# Patient Record
Sex: MALE | Race: BLACK OR AFRICAN AMERICAN | NOT HISPANIC OR LATINO | URBAN - METROPOLITAN AREA
[De-identification: names, ages, dates, MRNs, and addresses within clinical notes are randomized per-mention and may not be internally consistent; named-entity substitution may affect disease eponyms.]

---

## 2022-09-14 ENCOUNTER — OFFICE VISIT (OUTPATIENT)
Dept: URGENT CARE | Facility: CLINIC | Age: 22
End: 2022-09-14
Payer: MEDICAID

## 2022-09-14 VITALS
DIASTOLIC BLOOD PRESSURE: 84 MMHG | HEART RATE: 69 BPM | HEIGHT: 71 IN | TEMPERATURE: 98 F | WEIGHT: 297.88 LBS | RESPIRATION RATE: 16 BRPM | OXYGEN SATURATION: 98 % | BODY MASS INDEX: 41.7 KG/M2 | SYSTOLIC BLOOD PRESSURE: 129 MMHG

## 2022-09-14 DIAGNOSIS — K21.9 GASTROESOPHAGEAL REFLUX DISEASE, UNSPECIFIED WHETHER ESOPHAGITIS PRESENT: Primary | ICD-10-CM

## 2022-09-14 DIAGNOSIS — Z76.89 ENCOUNTER TO ESTABLISH CARE: ICD-10-CM

## 2022-09-14 PROCEDURE — 99203 PR OFFICE/OUTPT VISIT, NEW, LEVL III, 30-44 MIN: ICD-10-PCS | Mod: S$PBB,,, | Performed by: NURSE PRACTITIONER

## 2022-09-14 PROCEDURE — 99204 OFFICE O/P NEW MOD 45 MIN: CPT | Mod: PBBFAC | Performed by: NURSE PRACTITIONER

## 2022-09-14 PROCEDURE — 99203 OFFICE O/P NEW LOW 30 MIN: CPT | Mod: S$PBB,,, | Performed by: NURSE PRACTITIONER

## 2022-09-14 RX ORDER — OMEPRAZOLE 20 MG/1
20 CAPSULE, DELAYED RELEASE ORAL DAILY
Qty: 30 CAPSULE | Refills: 0 | Status: SHIPPED | OUTPATIENT
Start: 2022-09-14 | End: 2022-10-14

## 2022-09-14 NOTE — LETTER
September 14, 2022      [facility]  [address] [facility] [address]  Phone: [phone]       Patient: Ponce Cole   YOB: 2000  Date of Visit: 09/14/2022    To Whom It May Concern:    Irish Cole  was at [facility] on 09/14/2022. The patient may return to work/school on 9/15/22. If you have any questions or concerns, or if I can be of further assistance, please do not hesitate to contact me.    Sincerely,    BABATUNDE Hernandez NP

## 2022-09-14 NOTE — PATIENT INSTRUCTIONS
- Instructed patient to take omeprazole 30 minutes before eating  - Advised about diet and lifestyle modifications related to reflux   -Referred to family practice to establish primary care provider.   -Please follow instructions on patient education material.  Return to urgent care in 2 to 3 days if symptoms are not improving, immediately if you develop any new or worsening symptoms.

## 2022-09-14 NOTE — PROGRESS NOTES
"Subjective:       Patient ID: Ponce Cole is a 22 y.o. male.    Vitals:  height is 5' 11.26" (1.81 m) and weight is 135.1 kg (297 lb 14.4 oz). His temperature is 98.2 °F (36.8 °C). His blood pressure is 129/84 and his pulse is 69. His respiration is 16 and oxygen saturation is 98%.     Chief Complaint: States acid reflux (Epigastric burning with nausea. ~ 1 year)    He reports having epigastric pain with a burning sensation intermittently for several years which occurs mostly in the mornings. He will experience nausea at times with reflux symptoms. He says that he was on Pepcid years ago but is no longer taking it. Reflux symptoms resolved with Tums. He denies chest pain, SOB, irregular heartbeat, dizziness, headaches.     No PCP.         Constitution: Negative for activity change, appetite change, fatigue and fever.   HENT:  Negative for trouble swallowing.    Neck: neck negative.   Cardiovascular:  Negative for chest pain, palpitations and sob on exertion.   Respiratory: Negative.     Gastrointestinal:  Positive for nausea (associated with reflux symptoms) and heartburn. Negative for abdominal pain, vomiting, constipation and diarrhea.   Musculoskeletal: Negative.    Neurological:  Negative for dizziness and headaches.     Objective:      Physical Exam      Assessment:       1. Gastroesophageal reflux disease, unspecified whether esophagitis present          Plan:         Gastroesophageal reflux disease, unspecified whether esophagitis present  - Instructed patient to take omeprazole 30 minutes before eating  - Advised about diet and lifestyle modifications related to reflux   -Referred to family practice to establish primary care provider.   -Please follow instructions on patient education material.  Return to urgent care in 2 to 3 days if symptoms are not improving, immediately if you develop any new or worsening symptoms.                   "

## 2023-06-20 ENCOUNTER — PATIENT MESSAGE (OUTPATIENT)
Dept: RESEARCH | Facility: HOSPITAL | Age: 23
End: 2023-06-20
Payer: MEDICAID

## 2024-06-09 ENCOUNTER — OFFICE VISIT (OUTPATIENT)
Dept: URGENT CARE | Facility: CLINIC | Age: 24
End: 2024-06-09

## 2024-06-09 VITALS
BODY MASS INDEX: 39.31 KG/M2 | DIASTOLIC BLOOD PRESSURE: 91 MMHG | HEART RATE: 62 BPM | RESPIRATION RATE: 19 BRPM | OXYGEN SATURATION: 98 % | WEIGHT: 280.81 LBS | SYSTOLIC BLOOD PRESSURE: 132 MMHG | TEMPERATURE: 99 F | HEIGHT: 71 IN

## 2024-06-09 DIAGNOSIS — M79.631 RIGHT FOREARM PAIN: ICD-10-CM

## 2024-06-09 DIAGNOSIS — R03.0 ELEVATED BLOOD PRESSURE READING WITHOUT DIAGNOSIS OF HYPERTENSION: ICD-10-CM

## 2024-06-09 DIAGNOSIS — R51.9 SCALP PAIN: Primary | ICD-10-CM

## 2024-06-09 PROCEDURE — 99214 OFFICE O/P EST MOD 30 MIN: CPT | Mod: PBBFAC | Performed by: NURSE PRACTITIONER

## 2024-06-09 PROCEDURE — 99203 OFFICE O/P NEW LOW 30 MIN: CPT | Mod: S$PBB,,, | Performed by: NURSE PRACTITIONER

## 2024-06-09 RX ORDER — DICLOFENAC SODIUM 75 MG/1
75 TABLET, DELAYED RELEASE ORAL 2 TIMES DAILY
Qty: 28 TABLET | Refills: 0 | Status: SHIPPED | OUTPATIENT
Start: 2024-06-09 | End: 2024-06-23

## 2024-06-09 NOTE — LETTER
June 9, 2024      [facility]  [facility] [address]  Phone: [phone]       Patient: Ponce Cole   YOB: 2000  Date of Visit: 06/09/2024    To Whom It May Concern:    Irish Cole  was at [facility] on 06/09/2024. The patient may return to work/school on 06/10/2024 with no restrictions. If you have any questions or concerns, or if I can be of further assistance, please do not hesitate to contact me.    Sincerely,    CRUZITO Reardon

## 2024-06-09 NOTE — PROGRESS NOTES
"Subjective:      Patient ID: Ponce Cole is a 24 y.o. male.    Vitals:  height is 5' 11.26" (1.81 m) and weight is 127.4 kg (280 lb 12.8 oz). His oral temperature is 98.5 °F (36.9 °C). His blood pressure is 132/91 (abnormal) and his pulse is 62. His respiration is 19 and oxygen saturation is 98%.     Chief Complaint: Arm Pain (Left sided, 2 days. Pt was lifting some things.) and Headache (To back of head, for 2 weeks)    Arm Pain     Headache      As stated in CC. Pt is c/o scalp pain to occipital head x 2 weeks. Pt is wearing a hat today. Pt reports pain in area where hat is resting on scalp. Pt reports pain is dull and sometimes constant. Pt denies realizing if pain is present without hat, but he states " I wear the hat all day until bed time usually".  Pt described left and right forearm pain as it is present when "picking up items like car batteries,some time I can just drop them, Pain feels sore sometimes too." Pain is intermittent and pain began in left arm but is now in both. Pt denies wrist pain, extremity weakness, visual disturbances, head trauma, injury, fall, shortness of breath, chest pain, cough    Neurological:  Positive for headaches.      Objective:     Physical Exam   Constitutional: He appears well-developed.  Non-toxic appearance. He does not appear ill. No distress.   HENT:   Head: Atraumatic.   Nose: No purulent discharge. Right sinus exhibits no maxillary sinus tenderness and no frontal sinus tenderness. Left sinus exhibits no maxillary sinus tenderness and no frontal sinus tenderness.   Mouth/Throat: Uvula is midline.   Eyes: Right eye exhibits no discharge. Left eye exhibits no discharge. Extraocular movement intact   Neck: Neck supple. No neck rigidity present.   Cardiovascular: Regular rhythm.   Pulmonary/Chest: Effort normal and breath sounds normal. No respiratory distress. He has no wheezes. He has no rales.   Abdominal: Bowel sounds are normal.   Musculoskeletal: Normal range of " motion.         General: No swelling, tenderness, deformity or signs of injury. Normal range of motion.        Arms:       Right hand: Normal. He exhibits normal capillary refill. Decreased sensation is not present in the ulnar distribution, is not present in the medial distribution and is not present in the radial distribution. Motor /Testing: The patient has normal right wrist strength.      Left hand: Normal. He exhibits normal capillary refill. Decreased sensation is not present in the ulnar distribution, is not present in the medial redistribution and is not present in the radial distribution. Motor /Testing: The patient has normal left wrist strength.      Right lower leg: No edema.      Left lower leg: No edema.      Comments: No TTP at site that are areas of complaint. There is brisk cap refill to fingers, strong  to bilateral hand and no wrist TTP or swelling. There is a lot of callus to palm of hand   Lymphadenopathy:     He has no cervical adenopathy.   Neurological: He is alert.   Skin: Skin is warm, dry and not diaphoretic.   Psychiatric: His behavior is normal. Mood, judgment and thought content normal.   Nursing note and vitals reviewed.      Assessment:     1. Scalp pain    2. Right forearm pain    3. Elevated blood pressure reading without diagnosis of hypertension        Plan:   ER precautions given  Referral for PCP sent today  Discussed elevated blood pressure and the risk, instructed on monitoring his headaches and blood pressure.   Discussed forearm pain and carpel tunnel syndrome and splinting for comfort  Encouraged RICE.  Treated with NSAID for pain.       Scalp pain  -     diclofenac (VOLTAREN) 75 MG EC tablet; Take 1 tablet (75 mg total) by mouth 2 (two) times daily. With food, if needed for pain. Do not combine with ibuprofen, naproxen or other NSAIDs. for 14 days  Dispense: 28 tablet; Refill: 0  -     Ambulatory referral/consult to Internal Medicine    Right forearm pain  -      diclofenac (VOLTAREN) 75 MG EC tablet; Take 1 tablet (75 mg total) by mouth 2 (two) times daily. With food, if needed for pain. Do not combine with ibuprofen, naproxen or other NSAIDs. for 14 days  Dispense: 28 tablet; Refill: 0  -     Ambulatory referral/consult to Internal Medicine    Elevated blood pressure reading without diagnosis of hypertension  -     Ambulatory referral/consult to Internal Medicine          Medical Decision Making:   Differential Diagnosis:   Carpel tunnel syndrome, De Quervain tendinopathy  Uncontrolled blood pressure, Tension headache, Cluster headache

## 2024-06-09 NOTE — PATIENT INSTRUCTIONS
Please follow instructions on patient education material.  Return to urgent care in 2 to 3 days if symptoms are not improving, immediately if you develop any new or worsening symptoms.   Although It appears that your head pain is related to the consistent wear of your hat and the hardware is pressing into your skin, As discussed it is important to monitor your headaches overall and also as it relates to uncontrolled blood pressure.   Referral for PCP sent today  - drink plenty of water, take breaks at work to elevate your feet, wear compression socks to your work shifts  -Take your blood pressure about 4-5 times over the next 1-2 weeks  If readings remain >160/>100 please return to this clinic.. Please bring in your blood pressure and heart rate log sheet if you have to come back, or to your next appointment with your Primary Care Doctor or Provider.    If you have change in vision, confusion, difficulty speaking or understanding, weakness of one side of your face, weakness or heaviness in 1 arm or leg, or numbness/tingling in one side - please go immediately to the ER.

## 2024-06-12 ENCOUNTER — OFFICE VISIT (OUTPATIENT)
Dept: URGENT CARE | Facility: CLINIC | Age: 24
End: 2024-06-12

## 2024-06-12 VITALS
DIASTOLIC BLOOD PRESSURE: 85 MMHG | SYSTOLIC BLOOD PRESSURE: 149 MMHG | HEART RATE: 64 BPM | TEMPERATURE: 98 F | BODY MASS INDEX: 38.06 KG/M2 | WEIGHT: 281 LBS | OXYGEN SATURATION: 99 % | RESPIRATION RATE: 16 BRPM | HEIGHT: 72 IN

## 2024-06-12 DIAGNOSIS — I10 PRIMARY HYPERTENSION: ICD-10-CM

## 2024-06-12 DIAGNOSIS — R51.9 RECURRENT OCCIPITAL HEADACHE: ICD-10-CM

## 2024-06-12 DIAGNOSIS — R03.0 ELEVATED BLOOD PRESSURE READING: ICD-10-CM

## 2024-06-12 PROCEDURE — 99214 OFFICE O/P EST MOD 30 MIN: CPT | Mod: PBBFAC

## 2024-06-12 PROCEDURE — 99213 OFFICE O/P EST LOW 20 MIN: CPT | Mod: S$PBB,,,

## 2024-06-12 RX ORDER — AMLODIPINE BESYLATE 5 MG/1
5 TABLET ORAL DAILY
Qty: 30 TABLET | Refills: 0 | Status: SHIPPED | OUTPATIENT
Start: 2024-06-12 | End: 2024-07-12

## 2024-06-12 RX ORDER — BUTALBITAL, ACETAMINOPHEN AND CAFFEINE 50; 325; 40 MG/1; MG/1; MG/1
1 TABLET ORAL EVERY 6 HOURS PRN
Qty: 20 TABLET | Refills: 0 | Status: SHIPPED | OUTPATIENT
Start: 2024-06-12

## 2024-06-12 NOTE — PROGRESS NOTES
Subjective:       Patient ID: Ponce Cole is a 24 y.o. male.    Vitals:  height is 6' (1.829 m) and weight is 127.5 kg (281 lb). His oral temperature is 97.8 °F (36.6 °C). His blood pressure is 149/85 (abnormal) and his pulse is 64. His respiration is 16 and oxygen saturation is 99%.     Chief Complaint: Head Pressure (Patient reports having a pressure to the back of is head. Patient was seen on 6/9 and was told to return if not better.)    25 y/o AAM presents to clinic alone, reports symptoms X 2 weeks with no improvement with Diclofenac prescribed. Describes pain as constant pressure in back of head. Denies any injuries or trauma. Denies HTN hx.         Constitution: Negative for fever.   HENT:  Negative for facial trauma, sinus pain and sinus pressure.    Eyes:  Negative for blurred vision.   Neurological:  Positive for headaches. Negative for dizziness, speech difficulty and history of migraines.       Objective:      Physical Exam   Constitutional: He is oriented to person, place, and time. He is cooperative. He is easily aroused. He does not appear ill. overweightawake  HENT:   Head: Normocephalic and atraumatic.   Ears:   Right Ear: A middle ear effusion is present.   Left Ear: Tympanic membrane normal.   Nose: Nose normal.   Mouth/Throat: Uvula is midline and oropharynx is clear and moist.   Eyes: Conjunctivae and lids are normal. Pupils are equal, round, and reactive to light. Lids are everted and swept, no foreign bodies found. Extraocular movement intact vision grossly intact gaze aligned appropriately   Neck: Trachea normal and phonation normal. Neck supple. No JVD present.   Cardiovascular: S1 normal, S2 normal and normal heart sounds. Bradycardia present.   Pulses:       Radial pulses are 2+ on the right side and 2+ on the left side.      Comments: Apical 56 BPM    Pulmonary/Chest: Effort normal and breath sounds normal.   Lymphadenopathy:     He has no cervical adenopathy.   Neurological: He is  alert, oriented to person, place, and time and easily aroused. He has normal sensation and intact cranial nerves (2-12). Gait and coordination normal. GCS eye subscore is 4. GCS verbal subscore is 5. GCS motor subscore is 6.   Skin: Skin is warm, dry and intact. Capillary refill takes 2 to 3 seconds.   Psychiatric: His speech is normal and behavior is normal. Memory normal. Cognition normal  Nursing note and vitals reviewed.        Assessment:       1. Recurrent occipital headache    2. Primary hypertension    3. Elevated blood pressure reading          Plan:     Differentials discussed with Ponce. Will Rx Norvasc r/t symptomatic and two separate occasions of elevated BP.     Encouraged patient to adhere to lifestyle modifications, keep a log BPs, when to report to ER. Keep your scheduled appt with PCP next month.     Recurrent occipital headache  -     butalbital-acetaminophen-caffeine -40 mg (FIORICET, ESGIC) -40 mg per tablet; Take 1 tablet by mouth every 6 (six) hours as needed for Pain or Headaches.  Dispense: 20 tablet; Refill: 0    Primary hypertension  -     amLODIPine (NORVASC) 5 MG tablet; Take 1 tablet (5 mg total) by mouth once daily.  Dispense: 30 tablet; Refill: 0    Elevated blood pressure reading           No results found for this or any previous visit.     Medical Decision Making:   Differential Diagnosis:   Tension Headache, Cluster HA, HTN,

## 2024-06-12 NOTE — LETTER
June 12, 2024      [facility]  [facility] [facility] [address]  Phone: [phone]       Patient: Ponce Cole   YOB: 2000  Date of Visit: 06/12/2024    To Whom It May Concern:    Irish Cole  was at [facility] on 06/12/2024. The patient may return to work/school on 06/13/2024 with no restrictions. If you have any questions or concerns, or if I can be of further assistance, please do not hesitate to contact me.    Sincerely,    CRUZITO Rousseau

## 2024-08-13 ENCOUNTER — OFFICE VISIT (OUTPATIENT)
Dept: URGENT CARE | Facility: CLINIC | Age: 24
End: 2024-08-13

## 2024-08-13 VITALS
BODY MASS INDEX: 38.19 KG/M2 | SYSTOLIC BLOOD PRESSURE: 143 MMHG | WEIGHT: 282 LBS | HEART RATE: 95 BPM | RESPIRATION RATE: 20 BRPM | TEMPERATURE: 102 F | HEIGHT: 72 IN | DIASTOLIC BLOOD PRESSURE: 87 MMHG | OXYGEN SATURATION: 100 %

## 2024-08-13 DIAGNOSIS — R50.9 FEVER, UNSPECIFIED FEVER CAUSE: ICD-10-CM

## 2024-08-13 DIAGNOSIS — J00 ACUTE NASOPHARYNGITIS (COMMON COLD): Primary | ICD-10-CM

## 2024-08-13 LAB
CTP QC/QA: YES
MOLECULAR STREP A: NEGATIVE
POC MOLECULAR INFLUENZA A AGN: NEGATIVE
POC MOLECULAR INFLUENZA B AGN: NEGATIVE
SARS-COV-2 RDRP RESP QL NAA+PROBE: NEGATIVE

## 2024-08-13 PROCEDURE — 87635 SARS-COV-2 COVID-19 AMP PRB: CPT | Mod: PBBFAC

## 2024-08-13 PROCEDURE — 99214 OFFICE O/P EST MOD 30 MIN: CPT | Mod: S$PBB,,,

## 2024-08-13 PROCEDURE — 87651 STREP A DNA AMP PROBE: CPT | Mod: PBBFAC

## 2024-08-13 PROCEDURE — 99215 OFFICE O/P EST HI 40 MIN: CPT | Mod: PBBFAC

## 2024-08-13 PROCEDURE — 25000003 PHARM REV CODE 250

## 2024-08-13 PROCEDURE — 87502 INFLUENZA DNA AMP PROBE: CPT | Mod: PBBFAC

## 2024-08-13 RX ORDER — IBUPROFEN 600 MG/1
600 TABLET ORAL
Status: COMPLETED | OUTPATIENT
Start: 2024-08-13 | End: 2024-08-13

## 2024-08-13 RX ORDER — ACETAMINOPHEN AND CODEINE PHOSPHATE 300; 30 MG/1; MG/1
1 TABLET ORAL EVERY 6 HOURS PRN
Qty: 12 TABLET | Refills: 0 | Status: SHIPPED | OUTPATIENT
Start: 2024-08-13 | End: 2024-08-16

## 2024-08-13 RX ADMIN — IBUPROFEN 600 MG: 600 TABLET ORAL at 02:08

## 2024-08-13 NOTE — LETTER
August 13, 2024      [facility]  [facility] [facility] [address]  Phone: [phone]       Patient: Ponce Cole   YOB: 2000  Date of Visit: 08/13/2024    To Whom It May Concern:    Irish Cole  was at [facility] on 08/13/2024. The patient may return to work/school on 08/16/2024 with no restrictions. If you have any questions or concerns, or if I can be of further assistance, please do not hesitate to contact me.    Sincerely,    CRUZITO Rousseau

## 2024-08-13 NOTE — PROGRESS NOTES
Subjective:       Patient ID: Ponce Cole is a 24 y.o. male.    Vitals:  height is 6' (1.829 m) and weight is 127.9 kg (282 lb). His oral temperature is 102.4 °F (39.1 °C) (abnormal). His blood pressure is 143/87 (abnormal) and his pulse is 95. His respiration is 20 and oxygen saturation is 100%.     Chief Complaint: Sore Throat (sore throat, body aches, joint pain,fever, chills and head ache - Entered by patient) and URI (Cough, nasal congestion, body aches, headache, sore throat, fatigue and chills. Exposed to Covid)    Sore Throat   Associated symptoms include congestion, coughing, diarrhea, headaches and vomiting. Pertinent negatives include no ear pain.   URI   Associated symptoms include congestion, coughing, diarrhea, headaches, nausea, a sore throat and vomiting. Pertinent negatives include no ear pain.       Constitution: Positive for fever.   HENT:  Positive for congestion and sore throat. Negative for ear pain.    Respiratory:  Positive for cough. Negative for sputum production, COPD and asthma.    Gastrointestinal:  Positive for nausea, vomiting and diarrhea.   Musculoskeletal:  Positive for muscle ache.   Allergic/Immunologic: Negative for asthma.   Neurological:  Positive for headaches.       Objective:      Physical Exam   Constitutional: He is oriented to person, place, and time. He is cooperative. He is easily aroused.  Non-toxic appearance. He appears ill. obesityawake  HENT:   Head: Normocephalic and atraumatic.   Ears:   Right Ear: Tympanic membrane and ear canal normal.   Left Ear: Tympanic membrane and ear canal normal.   Nose: Mucosal edema present.   Mouth/Throat: Uvula is midline and mucous membranes are normal. Dental caries present. Posterior oropharyngeal erythema present. Tonsils are 2+ on the right. Tonsils are 2+ on the left. No tonsillar exudate.   Eyes: Lids are normal. Right conjunctiva is injected. Left conjunctiva is injected.   Neck: Neck supple.   Cardiovascular: Normal rate.    Pulmonary/Chest: Effort normal and breath sounds normal.   Abdominal: Bowel sounds are normal. Soft. There is no abdominal tenderness.   Lymphadenopathy:     He has no cervical adenopathy.   Neurological: He is alert, oriented to person, place, and time and easily aroused. Gait normal. GCS eye subscore is 4. GCS verbal subscore is 5. GCS motor subscore is 6.   Skin: Skin is warm, dry, intact and not diaphoretic. Capillary refill takes less than 2 seconds.   Psychiatric: His speech is normal and behavior is normal.   Nursing note and vitals reviewed.        Assessment:       1. Acute nasopharyngitis (common cold)    2. Fever, unspecified fever cause          Plan:    All testing is negative today in clinic, discussed with patient symptoms may be viral r/t. Encouraged to alternate Tylenol/Motrin, ensure he remains hydrated, when to seek ER treatment.     Fever is trending down , appears stable for discharge.      Acute nasopharyngitis (common cold)    Fever, unspecified fever cause  -     ibuprofen tablet 600 mg  -     POCT COVID-19 Rapid Screening  -     POCT Strep A, Molecular  -     POCT Influenza A/B MOLECULAR  -     acetaminophen-codeine 300-30mg (TYLENOL #3) 300-30 mg Tab; Take 1 tablet by mouth every 6 (six) hours as needed (cough /body aches).  Dispense: 12 tablet; Refill: 0           Results for orders placed or performed in visit on 08/13/24   POCT COVID-19 Rapid Screening   Result Value Ref Range    POC Rapid COVID Negative Negative     Acceptable Yes    POCT Strep A, Molecular   Result Value Ref Range    Molecular Strep A, POC Negative Negative     Acceptable Yes    POCT Influenza A/B MOLECULAR   Result Value Ref Range    POC Molecular Influenza A Ag Negative Negative    POC Molecular Influenza B Ag Negative Negative     Acceptable Yes            Additional MDM:     Heart Failure Score:   COPD = No

## 2025-02-12 ENCOUNTER — OFFICE VISIT (OUTPATIENT)
Dept: URGENT CARE | Facility: CLINIC | Age: 25
End: 2025-02-12
Payer: COMMERCIAL

## 2025-02-12 VITALS
DIASTOLIC BLOOD PRESSURE: 81 MMHG | BODY MASS INDEX: 37.62 KG/M2 | HEART RATE: 87 BPM | RESPIRATION RATE: 18 BRPM | TEMPERATURE: 99 F | OXYGEN SATURATION: 98 % | WEIGHT: 283.88 LBS | HEIGHT: 73 IN | SYSTOLIC BLOOD PRESSURE: 120 MMHG

## 2025-02-12 DIAGNOSIS — J10.1 INFLUENZA A: Primary | ICD-10-CM

## 2025-02-12 DIAGNOSIS — R09.89 SYMPTOMS OF UPPER RESPIRATORY INFECTION (URI): ICD-10-CM

## 2025-02-12 LAB
CTP QC/QA: YES
MOLECULAR STREP A: NEGATIVE
POC MOLECULAR INFLUENZA A AGN: POSITIVE
POC MOLECULAR INFLUENZA B AGN: NEGATIVE
SARS CORONAVIRUS 2 ANTIGEN: NEGATIVE

## 2025-02-12 PROCEDURE — 87651 STREP A DNA AMP PROBE: CPT | Mod: PBBFAC

## 2025-02-12 PROCEDURE — 87811 SARS-COV-2 COVID19 W/OPTIC: CPT | Mod: PBBFAC

## 2025-02-12 PROCEDURE — 99213 OFFICE O/P EST LOW 20 MIN: CPT | Mod: S$PBB,,,

## 2025-02-12 PROCEDURE — 87502 INFLUENZA DNA AMP PROBE: CPT | Mod: PBBFAC

## 2025-02-12 PROCEDURE — 99214 OFFICE O/P EST MOD 30 MIN: CPT | Mod: PBBFAC

## 2025-02-12 RX ORDER — OSELTAMIVIR PHOSPHATE 75 MG/1
75 CAPSULE ORAL 2 TIMES DAILY
Qty: 10 CAPSULE | Refills: 0 | Status: SHIPPED | OUTPATIENT
Start: 2025-02-12 | End: 2025-02-17

## 2025-02-12 RX ORDER — PROMETHAZINE HYDROCHLORIDE AND DEXTROMETHORPHAN HYDROBROMIDE 6.25; 15 MG/5ML; MG/5ML
5 SYRUP ORAL EVERY 4 HOURS PRN
Qty: 118 ML | Refills: 0 | Status: SHIPPED | OUTPATIENT
Start: 2025-02-12 | End: 2025-02-22

## 2025-02-12 RX ORDER — LEVOCETIRIZINE DIHYDROCHLORIDE 5 MG/1
5 TABLET, FILM COATED ORAL NIGHTLY
Qty: 30 TABLET | Refills: 11 | Status: SHIPPED | OUTPATIENT
Start: 2025-02-12 | End: 2026-02-12

## 2025-02-12 NOTE — LETTER
February 12, 2025      [facility]  [facility] [facility] [address]  Phone: [phone]       Patient: Ponce Cole   YOB: 2000  Date of Visit: 02/12/2025    To Whom It May Concern:    Irish Cole  was at [facility] on 02/12/2025. The patient may return to work/school on 2/16/2025 with no restrictions. If you have any questions or concerns, or if I can be of further assistance, please do not hesitate to contact me.    Sincerely,    CRUZITO Graham

## 2025-02-12 NOTE — PROGRESS NOTES
"Subjective:       Patient ID: Ponce Cole is a 24 y.o. male.    Vitals:  height is 6' 1" (1.854 m) and weight is 128.8 kg (283 lb 14.4 oz). His temperature is 99 °F (37.2 °C). His blood pressure is 120/81 and his pulse is 87. His respiration is 18 and oxygen saturation is 98%.     Chief Complaint: URI (Body aches, felt feverish, cough since yesterday.)    24-year-old male reports to the clinic with complaints of body aches, cough, and feeling feverish that began yesterday.  Patient states that his girlfriend currently is positive for the flu and that he has been taking care of her with her illness.  Patient is requesting flu testing at today's visit.  Patient states he has been taking over-the-counter Tylenol cold and flu with little relief.  And states that his cough seems to worsen at night when he lays down.    All other systems are negative    Chart reviewed    Objective:   Physical Exam   Constitutional: He appears well-developed.  Non-toxic appearance. He does not appear ill. No distress.   HENT:   Head: Normocephalic and atraumatic.   Ears:   Right Ear: Hearing, tympanic membrane, external ear and ear canal normal.   Left Ear: Hearing, tympanic membrane, external ear and ear canal normal.   Nose: Mucosal edema and rhinorrhea present. No purulent discharge. Right sinus exhibits no maxillary sinus tenderness and no frontal sinus tenderness. Left sinus exhibits no maxillary sinus tenderness and no frontal sinus tenderness.   Mouth/Throat: Uvula is midline. Posterior oropharyngeal edema and posterior oropharyngeal erythema present.   Eyes: Right eye exhibits no discharge. Left eye exhibits no discharge. Extraocular movement intact   Neck: Neck supple. No neck rigidity present.   Cardiovascular: Regular rhythm.   Pulmonary/Chest: Effort normal and breath sounds normal. No respiratory distress. He has no wheezes. He has no rhonchi. He has no rales.   Lymphadenopathy:     He has no cervical adenopathy. "   Neurological: He is alert.   Skin: Skin is warm, dry and not diaphoretic.   Psychiatric: His behavior is normal.   Nursing note and vitals reviewed.      Assessment:     1. Influenza A    2. Symptoms of upper respiratory infection (URI)        Results for orders placed or performed in visit on 02/12/25   POCT Influenza A/B Molecular    Collection Time: 02/12/25 10:55 AM   Result Value Ref Range    POC Molecular Influenza A Ag Positive (A) Negative    POC Molecular Influenza B Ag Negative Negative     Acceptable Yes    POCT Strep A, Molecular    Collection Time: 02/12/25 10:56 AM   Result Value Ref Range    Molecular Strep A, POC Negative Negative     Acceptable Yes    SARS Coronavirus 2 Antigen, POCT Manual Read    Collection Time: 02/12/25 10:57 AM   Result Value Ref Range    SARS Coronavirus 2 Antigen Negative Negative, Presumptive Negative     Acceptable Yes          Plan:     Will give a course of Tamiflu.  Encouraged fluids.  Discussed contagious precautions.  Provided excuse if indicated.    Please follow instructions on patient education material.  Return if not improving in several days, sooner if new or worsening symptoms develop.     Influenza A  -     oseltamivir (TAMIFLU) 75 MG capsule; Take 1 capsule (75 mg total) by mouth 2 (two) times daily. for 5 days  Dispense: 10 capsule; Refill: 0  -     promethazine-dextromethorphan (PROMETHAZINE-DM) 6.25-15 mg/5 mL Syrp; Take 5 mLs by mouth every 4 (four) hours as needed (cough).  Dispense: 118 mL; Refill: 0  -     levocetirizine (XYZAL) 5 MG tablet; Take 1 tablet (5 mg total) by mouth every evening.  Dispense: 30 tablet; Refill: 11    Symptoms of upper respiratory infection (URI)  -     POCT Influenza A/B Molecular  -     SARS Coronavirus 2 Antigen, POCT Manual Read  -     POCT Strep A, Molecular        Please note: This chart was completed via voice to text dictation. It may contain typographical/word recognition  errors. If there are any questions, please contact the provider for final clarification.